# Patient Record
Sex: FEMALE | Race: BLACK OR AFRICAN AMERICAN | NOT HISPANIC OR LATINO | URBAN - METROPOLITAN AREA
[De-identification: names, ages, dates, MRNs, and addresses within clinical notes are randomized per-mention and may not be internally consistent; named-entity substitution may affect disease eponyms.]

---

## 2023-09-30 ENCOUNTER — EMERGENCY (EMERGENCY)
Facility: HOSPITAL | Age: 33
LOS: 1 days | Discharge: HOME | End: 2023-09-30
Admitting: EMERGENCY MEDICINE
Payer: SELF-PAY

## 2023-09-30 VITALS
HEIGHT: 62 IN | SYSTOLIC BLOOD PRESSURE: 110 MMHG | WEIGHT: 220.02 LBS | DIASTOLIC BLOOD PRESSURE: 69 MMHG | OXYGEN SATURATION: 100 % | RESPIRATION RATE: 17 BRPM | HEART RATE: 61 BPM | TEMPERATURE: 98 F

## 2023-09-30 PROCEDURE — 73130 X-RAY EXAM OF HAND: CPT

## 2023-09-30 PROCEDURE — 73130 X-RAY EXAM OF HAND: CPT | Mod: 26,RT

## 2023-09-30 PROCEDURE — 99284 EMERGENCY DEPT VISIT MOD MDM: CPT

## 2023-09-30 PROCEDURE — 99283 EMERGENCY DEPT VISIT LOW MDM: CPT | Mod: 25

## 2023-09-30 NOTE — ED ADULT NURSE NOTE - OBJECTIVE STATEMENT
33y F presents to ED c/o R hand second digit pain x4days. Endorses sudden onset pain without incidence but reports she regularly cracks knuckles. Denies fall/injury/trauma, numbness/tingling. Swelling to R second digit, no bruising/abrasion/bleeding noted. ROM limited d/t pain. Sensory intact. Pt AAOx4, speaking in full and clear sentences, NAD at this time. Ambulates with steady gait.

## 2023-09-30 NOTE — ED ADULT NURSE NOTE - NSFALLUNIVINTERV_ED_ALL_ED
Bed/Stretcher in lowest position, wheels locked, appropriate side rails in place/Call bell, personal items and telephone in reach/Instruct patient to call for assistance before getting out of bed/chair/stretcher/Non-slip footwear applied when patient is off stretcher/[place] to call system/Physically safe environment - no spills, clutter or unnecessary equipment/Purposeful proactive rounding/Room/bathroom lighting operational, light cord in reach

## 2023-09-30 NOTE — ED PROVIDER NOTE - CARE PROVIDER_API CALL
Xena Friedman  Surgery of the Hand  [address], [address]  [place] [address]  Phone: [phone]  Fax: [phone]  Follow Up Time:

## 2023-09-30 NOTE — ED PROVIDER NOTE - MUSCULOSKELETAL, MLM
right hand +TTP at 2nd MCPJ with +mild swelling of that joint, mild swelling of proximal right 2nd phalanx, +FROM phalanx, no swelling of finger distally, cap refill < 2 sec

## 2023-09-30 NOTE — ED PROVIDER NOTE - OBJECTIVE STATEMENT
32 y/o f presents c/o pain to right 2nd finger for the past 3 days.  Pt stating it started hurting her after she cracked her knuckles, having mild swelling to her right 2nd finger.  Pt went to [facility], had xr which was negative, told to follow up with pmd, take ibuprofen for pain.  Pt went to [facility] ED yesterday for another opinion, told she may possibly need MRI, again advised to see her pmd although pt stating she doesn't have one currently.  [facility] offered to make her an appt for their medicine clinic for 10/17/23 and pt declined, came to [facility] ED today for another opinion.  Pt hasn't taken anything for pain yet today.  Denies numbness/tingling to ext, all other ROS negative.

## 2023-09-30 NOTE — ED PROVIDER NOTE - NSFOLLOWUPINSTRUCTIONS_ED_ALL_ED_FT
Please take pain medications, ice right hand/2nd finger 10-15 mins at a time 3-4 times per day, follow up with orthopedics.

## 2023-09-30 NOTE — ED PROVIDER NOTE - CLINICAL SUMMARY MEDICAL DECISION MAKING FREE TEXT BOX
34 y/o f presents c/o right 2nd finger pain x 3 days; xr neg in ED, exam with mild swelling to proximal phalanx, neg for kanavel signs.  Will d/c, recommend to f/u with ortho hand, primary care, will have referral coordinator follow up with pt to expedite appts.  Recommend ice, elevate, pain meds.

## 2023-09-30 NOTE — ED PROVIDER NOTE - PATIENT PORTAL LINK FT
You can access the FollowMyHealth Patient Portal offered by [facility] by registering at the following website: [email or web address]. By joining [facility]’s FollowMyHealth portal, you will also be able to view your health information using other applications (apps) compatible with our system.

## 2023-09-30 NOTE — ED ADULT NURSE NOTE - OTHER COMPLAINTS
Denies injury/trauma, states "I crack my knuckles regularly". States went to  and [facility], negative XR.

## 2023-09-30 NOTE — ED ADULT NURSE NOTE - BREATHING, MLM
- BP stable   - continue to hold valsartan  - add PRN hydralazine for SBP greater than 160     Spontaneous, unlabored and symmetrical

## 2023-09-30 NOTE — ED ADULT TRIAGE NOTE - OTHER COMPLAINTS
Denies injury/trauma, states "I crack my knuckles regularly". States went to  and [facility], negative XR.

## 2023-10-02 DIAGNOSIS — S69.91XA UNSPECIFIED INJURY OF RIGHT WRIST, HAND AND FINGER(S), INITIAL ENCOUNTER: ICD-10-CM
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
